# Patient Record
Sex: FEMALE | URBAN - METROPOLITAN AREA
[De-identification: names, ages, dates, MRNs, and addresses within clinical notes are randomized per-mention and may not be internally consistent; named-entity substitution may affect disease eponyms.]

---

## 2024-11-18 ENCOUNTER — APPOINTMENT (OUTPATIENT)
Dept: URBAN - METROPOLITAN AREA CLINIC 246 | Age: 39
Setting detail: DERMATOLOGY
End: 2024-11-18

## 2024-11-18 DIAGNOSIS — L20.89 OTHER ATOPIC DERMATITIS: ICD-10-CM

## 2024-11-18 PROCEDURE — 99203 OFFICE O/P NEW LOW 30 MIN: CPT

## 2024-11-18 PROCEDURE — OTHER MIPS QUALITY: OTHER

## 2024-11-18 PROCEDURE — OTHER TREATMENT REGIMEN: OTHER

## 2024-11-18 PROCEDURE — OTHER PRESCRIPTION: OTHER

## 2024-11-18 PROCEDURE — OTHER COUNSELING: OTHER

## 2024-11-18 RX ORDER — CLOBETASOL PROPIONATE 0.5 MG/G
CREAM TOPICAL
Qty: 30 | Refills: 1 | Status: ERX | COMMUNITY
Start: 2024-11-18

## 2024-11-18 ASSESSMENT — LOCATION SIMPLE DESCRIPTION DERM
LOCATION SIMPLE: RIGHT HAND
LOCATION SIMPLE: RIGHT POSTERIOR AXILLA
LOCATION SIMPLE: LEFT HAND
LOCATION SIMPLE: POSTERIOR NECK

## 2024-11-18 ASSESSMENT — LOCATION DETAILED DESCRIPTION DERM
LOCATION DETAILED: RIGHT RADIAL PALM
LOCATION DETAILED: LEFT RADIAL PALM
LOCATION DETAILED: RIGHT POSTERIOR AXILLA
LOCATION DETAILED: MID POSTERIOR NECK

## 2024-11-18 ASSESSMENT — LOCATION ZONE DERM
LOCATION ZONE: NECK
LOCATION ZONE: HAND
LOCATION ZONE: AXILLAE

## 2024-11-18 NOTE — HPI: ITCHING
What Type Of Note Output Would You Prefer (Optional)?: Bullet Format
Additional History: Patient states medication helps with neck, and axilla but no improvement on hands. Patient states she has a cat.

## 2024-11-18 NOTE — PROCEDURE: TREATMENT REGIMEN
Show Aquaphor Line: Yes
Action 1: Continue
Initiate Regimen: Clobetasol 0.05 % topical cream \\nSig: Apply thin layer affected areas on hands twice daily for up to 2 weeks, take 1 week break. Repeat as needed for flares. Avoid face, groin, and skin folds.\\n\\nPatient instructed to only use this for 1-2 days to neck and axillae.
Plan: .\\n\\nTried betamethasone/calcipotriol
Detail Level: Zone